# Patient Record
Sex: FEMALE | Race: BLACK OR AFRICAN AMERICAN
[De-identification: names, ages, dates, MRNs, and addresses within clinical notes are randomized per-mention and may not be internally consistent; named-entity substitution may affect disease eponyms.]

---

## 2019-03-13 ENCOUNTER — HOSPITAL ENCOUNTER (EMERGENCY)
Dept: HOSPITAL 74 - JER | Age: 29
Discharge: LEFT BEFORE DISCHARGE | End: 2019-03-13
Payer: COMMERCIAL

## 2019-03-13 VITALS — SYSTOLIC BLOOD PRESSURE: 98 MMHG | HEART RATE: 78 BPM | TEMPERATURE: 98.1 F | DIASTOLIC BLOOD PRESSURE: 61 MMHG

## 2019-03-13 VITALS — BODY MASS INDEX: 21.4 KG/M2

## 2019-03-13 DIAGNOSIS — F10.220: Primary | ICD-10-CM
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
## 2019-03-13 LAB
ALBUMIN SERPL-MCNC: 3.9 G/DL (ref 3.4–5)
ALP SERPL-CCNC: 84 U/L (ref 45–117)
ALT SERPL-CCNC: 23 U/L (ref 13–61)
ANION GAP SERPL CALC-SCNC: 7 MMOL/L (ref 8–16)
AST SERPL-CCNC: 62 U/L (ref 15–37)
BASOPHILS # BLD: 1.4 % (ref 0–2)
BILIRUB SERPL-MCNC: 0.3 MG/DL (ref 0.2–1)
BUN SERPL-MCNC: 7 MG/DL (ref 7–18)
CALCIUM SERPL-MCNC: 8.7 MG/DL (ref 8.5–10.1)
CHLORIDE SERPL-SCNC: 107 MMOL/L (ref 98–107)
CO2 SERPL-SCNC: 25 MMOL/L (ref 21–32)
CREAT SERPL-MCNC: 0.7 MG/DL (ref 0.55–1.3)
DEPRECATED RDW RBC AUTO: 18.5 % (ref 11.6–15.6)
EOSINOPHIL # BLD: 0.9 % (ref 0–4.5)
GLUCOSE SERPL-MCNC: 61 MG/DL (ref 74–106)
HCT VFR BLD CALC: 38.4 % (ref 32.4–45.2)
HGB BLD-MCNC: 13.3 GM/DL (ref 10.7–15.3)
LYMPHOCYTES # BLD: 42.9 % (ref 8–40)
MAGNESIUM SERPL-MCNC: 2 MG/DL (ref 1.8–2.4)
MCH RBC QN AUTO: 31.7 PG (ref 25.7–33.7)
MCHC RBC AUTO-ENTMCNC: 34.7 G/DL (ref 32–36)
MCV RBC: 91.2 FL (ref 80–96)
MONOCYTES # BLD AUTO: 4.8 % (ref 3.8–10.2)
NEUTROPHILS # BLD: 50 % (ref 42.8–82.8)
PLATELET # BLD AUTO: 345 K/MM3 (ref 134–434)
PLATELET BLD QL SMEAR: ADEQUATE
PMV BLD: 8.1 FL (ref 7.5–11.1)
POTASSIUM SERPLBLD-SCNC: 4.9 MMOL/L (ref 3.5–5.1)
PROT SERPL-MCNC: 9.3 G/DL (ref 6.4–8.2)
RBC # BLD AUTO: 4.21 M/MM3 (ref 3.6–5.2)
SODIUM SERPL-SCNC: 139 MMOL/L (ref 136–145)
WBC # BLD AUTO: 6 K/MM3 (ref 4–10)

## 2019-03-13 NOTE — PDOC
History of Present Illness





- General


Chief Complaint: Alcohol intoxication


Stated Complaint: Syncope/Near Syncope


Time Seen by Provider: 03/13/19 16:00





- History of Present Illness


Initial Comments: 





03/13/19 16:50





29 yo F h/o muti-substance abuse with EtOH dependence sent from [facility] for 

seizure activity precaution. She presented to [facility] and told the staff in 

waiting room that she's about to have a seizure. Patient was then sent to [facility] 

ED for further evaluation. However, patient stated that she drinks a lot every 

day since 19 years old, 1 bottle of vodka. Her last drink was this morning 2am. 

She also endorses headache, L flank pain, shaking, sweaty but denies chest pain

, shortness of breath, urinary symptom, n/v, fever or chills.





Past History





- Past Medical History


Allergies/Adverse Reactions: 


 Allergies











Allergy/AdvReac Type Severity Reaction Status Date / Time


 


No Known Allergies Allergy   Verified 11/23/16 20:39











Home Medications: 


Ambulatory Orders





Citalopram Hydrobromide [Celexa -] 15 mg PO DAILY 02/26/15 


Escitalopram Oxalate [Lexapro -] 10 mg PO DAILY 02/26/15 


traZODone HCL [Desyrel -] 50 mg PO HS #30 tablet 02/27/15 


Citalopram Hydrobromide [Celexa -] 10 mg PO DAILY #90 tablet 11/24/16 


Mirtazapine [Remeron -] 15 mg PO HS #30 tablet 11/24/16 








Anemia: No


Asthma: No


Cancer: No


Cardiac Disorders: No


CVA: No


COPD: No


CHF: No


Dementia: No


Diabetes: No


GI Disorders: No


 Disorders: No


HTN: No


Hypercholesterolemia: No


Kidney Stones: No


Liver Disease: No


Seizures: Yes


Thyroid Disease: No





- Surgical History


Abdominal Surgery: No


Appendectomy: No


Cardiac Surgery: No


Cholecystectomy: No


Lung Surgery: No


Neurologic Surgery: No


Orthopedic Surgery: No





- Suicide/Smoking/Psychosocial Hx


Smoking History: Never smoked


Have you smoked in the past 12 months: No


Number of Cigarettes Smoked Daily: 20


'Breaking Loose' booklet given: 11/23/16


Hx Alcohol Use: Yes


Drug/Substance Use Hx: No


Substance Use Type: Alcohol


Hx Substance Use Treatment: Yes





**Review of Systems





- Review of Systems


Able to Perform ROS?: Yes


Is the patient limited English proficient: Yes


Constitutional: No: Chills, Fever, Weakness


Respiratory: No: Cough, Shortness of Breath


ABD/GI: No: Diarrhea


: Yes: Flank Pain.  No: Burning, Dysuria, Hematuria, Urgency


Neurological: Yes: Headache, Seizure, Tremors





*Physical Exam





- Vital Signs


 Last Vital Signs











Temp Pulse Resp BP Pulse Ox


 


 98.1 F   78   16   98/61   100 


 


 03/13/19 15:59  03/13/19 15:59  03/13/19 15:59  03/13/19 15:59  03/13/19 15:59














- Physical Exam


General Appearance: Yes: Alcohol on Breath, Intoxicated


Respiratory/Chest: positive: Lungs Clear, Normal Breath Sounds


Cardiovascular: positive: S1, S2, Tachycardia.  negative: Murmur


Musculoskeletal: positive: CVA Tenderness (L)


Neurologic: positive: Alert, Confused, Disoriented (to time)





Moderate Sedation





- Procedure Monitoring


Vital Signs: 


Procedure Monitoring Vital Signs











Temperature  98.1 F   03/13/19 15:59


 


Pulse Rate  78   03/13/19 15:59


 


Respiratory Rate  16   03/13/19 15:59


 


Blood Pressure  98/61   03/13/19 15:59


 


O2 Sat by Pulse Oximetry (%)  100   03/13/19 15:59











ED Treatment Course





- LABORATORY


CBC & Chemistry Diagram: 


 03/13/19 18:20





 03/13/19 18:20





*DC/Admit/Observation/Transfer


Diagnosis at time of Disposition: 


Alcohol dependence


Qualifiers:


 Substance use status: unspecified alcohol-induced disorder Qualified Code(s): 

F10.29 - Alcohol dependence with unspecified alcohol-induced disorder








- Discharge Dispostion


Disposition: ELOPED


Condition at time of disposition: Unchanged/Unknown





- Referrals


Referrals: 


Jennifer Ratliff MD [Primary Care Provider] - 





- Patient Instructions





- Post Discharge Activity

## 2019-03-13 NOTE — PDOC
Attending Attestation





- HPI


HPI: 





03/13/19 18:13


The patient is a 28 year old female, from [facility], with a significant past 

medical history of multi-substance abuse with EtOH dependence who presents to 

the emergency department intoxicated with seizure like symptoms. Patient 

reports she told [facility] that she had a shaking episode prior to her arrival 

to the ED. The patient reports she fell yesterday but is unsure if she hit her 

head. Patient notes her last drink was this morning but has a history of 

drinking 1 bottle of vodka per day. Patient is a poor historian.





The patient denies chest pain,  or shortness of breath


The patient denies fever, chills, nausea, vomit, diarrhea or constipation.


 





Allergies: NKDA


Past surgical history: None reported 


Social history: multi-substance abuse with EtOH 


PCP: Dr. Jennifer Ratliff








- Physicial Exam


PE: 





03/13/19 18:14


GENERAL: (+) tremors, (+) intoxicated, (+) poor historian. Awake, alert, in no 

acute distress


HEAD: No signs of trauma


EYES: PERRLA, EOMI, sclera anicteric, conjunctiva clear


ENT: (+) tongue fasciculation.  Auricles normal inspection, hearing grossly 

normal, nares patent, oropharynx clear without exudates. Moist mucosa


NECK: Normal ROM, supple, no lymphadenopathy, JVD, or masses


LUNGS: Breath sounds equal, clear to auscultation bilaterally.  No wheezes, and 

no crackles


HEART: Regular rate and rhythm, normal S1 and S2, no murmurs, rubs or gallops


ABDOMEN: Soft, nontender, normoactive bowel sounds.  No guarding, no rebound.  

No masses


EXTREMITIES: Normal range of motion, no edema.  No clubbing or cyanosis. No 

cords, erythema, or tenderness


NEUROLOGICAL: Cranial nerves II through XII grossly intact.  Normal speech, 

normal gait


SKIN: Warm, Dry, normal turgor, no rashes or lesions noted.








<Aruna Vaz - Last Filed: 03/13/19 18:13>





- Resident


Resident Name: Alejo Le





- ED Attending Attestation


I have performed the following: I have examined & evaluated the patient, The 

case was reviewed & discussed with the resident, I agree w/resident's findings 

& plan, Exceptions are as noted





- Medical Decision Making





03/13/19 16:07








I, Dr. Keke Jorgensen DO, attest that this document has been prepared under 

my direction and personally reviewed by me in its entirety.   I further attest, 

that it accurately reflects all work, treatment, procedures and medical decision

-making performed by me.  


03/13/19 17:11


a/p: 27yo female with etoh and substance abuse


-pt sent from [facility] for poss seizure activity


-pt denies seizure today, states she fell going up the stairs yesterday, poss 

seizure activity bc she remembers her limbs shaking. unsure if loc. no tongue 

biting or loss of urine/bowel control


-will send labs, mild hand tremors and tongue fasciculations


-will give banana bag and librium


-will medically clear for park care


03/13/19 20:12


h/h stable


low tsh, will need free t4


alcohol pending


pt up and walking around the ED, speaking in clear sentences


03/13/19 20:13


pt has walked out of the ED. pt has eloped from the ED





<Keke Jorgensen - Last Filed: 03/13/19 20:14>





*DC/Admit/Observation/Transfer





<Keke Jorgensen - Last Filed: 03/13/19 20:14>


Diagnosis at time of Disposition: 


 Alcohol dependence








- Discharge Dispostion


Disposition: ELOPED


Condition at time of disposition: Unchanged/Unknown





- Referrals


Referrals: 


Jennifer Ratliff MD [Primary Care Provider] - 





- Patient Instructions





- Post Discharge Activity





**Heart Score/ECG Review





- ECG Intrepretation


Comment:: 





03/13/19 17:53


sinus at 89, nl axis, nl interval, no acute st/t wave findings





<Keke Jorgensen - Last Filed: 03/13/19 20:14>





Attestations





- Attestations





03/13/19 18:15





Documentation prepared by Arnua Vaz, acting as medical scribe for Keke Jorgensen DO, MD





<Aruna Vaz - Last Filed: 03/13/19 18:13>

## 2019-03-14 NOTE — EKG
Test Reason : 

Blood Pressure : ***/*** mmHG

Vent. Rate : 089 BPM     Atrial Rate : 089 BPM

   P-R Int : 152 ms          QRS Dur : 074 ms

    QT Int : 384 ms       P-R-T Axes : 050 057 044 degrees

   QTc Int : 467 ms

 

NORMAL SINUS RHYTHM

POSSIBLE LEFT ATRIAL ENLARGEMENT

POSSIBLE ANTERIOR INFARCT , AGE UNDETERMINED

ABNORMAL ECG

NO PREVIOUS ECGS AVAILABLE

Confirmed by KATHLEEN FORD MD ([id number]) on 3/14/2019 10:50:16 AM

 

Referred By:             Confirmed By:KATHLEEN FORD MD

## 2019-09-11 ENCOUNTER — HOSPITAL ENCOUNTER (INPATIENT)
Dept: HOSPITAL 74 - YASAS | Age: 29
LOS: 4 days | Discharge: HOME | End: 2019-09-15
Attending: SURGERY | Admitting: SURGERY
Payer: COMMERCIAL

## 2019-09-11 VITALS — BODY MASS INDEX: 24 KG/M2

## 2019-09-11 DIAGNOSIS — F19.24: ICD-10-CM

## 2019-09-11 DIAGNOSIS — F17.213: ICD-10-CM

## 2019-09-11 DIAGNOSIS — F10.24: ICD-10-CM

## 2019-09-11 DIAGNOSIS — Z86.69: ICD-10-CM

## 2019-09-11 DIAGNOSIS — F32.9: ICD-10-CM

## 2019-09-11 DIAGNOSIS — F10.230: Primary | ICD-10-CM

## 2019-09-11 DIAGNOSIS — F10.282: ICD-10-CM

## 2019-09-11 PROCEDURE — HZ2ZZZZ DETOXIFICATION SERVICES FOR SUBSTANCE ABUSE TREATMENT: ICD-10-PCS | Performed by: ALLERGY & IMMUNOLOGY

## 2019-09-11 RX ADMIN — Medication SCH MG: at 21:41

## 2019-09-11 NOTE — HP
CIWA Score


Nausea/Vomitin-Mild Nausea/No Vomiting


Muscle Tremors: 3


Anxiety: 3


Agitation: 4-Moderately Restless


Paroxysmal Sweats: 1-Minimal Palms Moist


Orientation: 0-Oriented


Tacttile Disturbances: 1-Very Mild Itch/Numbness


Auditory Disturbances: 0-None


Visual Disturbances: 3-Moderate Sensitivity


Headache: 0-None Present


CIWA-Ar Total Score: 16





- Admission Criteria


[facility] Guidelines: Admission for Medically Managed Detox: 


Requires at least one of the followin. CIWA greater than 12


2. Seizures within the past 24 hours


3. Delirium tremens within the past 24 hours


4. Hallucinations within the past 24 hours


5. Acute intervention needed for co  occurring medical disorder


6. Acute intervention needed for co  occurring psychiatric disorder


7. Severe withdrawal that cannot be handled at a lower level of care (continued


    vomiting, continued diarrhea, abnormal vital signs) requiring intravenous


    medication and/or fluids


8. Pregnancy





Patient presents the following: CIWA greater than 12


Admission Criteria Met: Admission criteria met





Admission ROS S





- \[facility]\""


Chief Complaint: 


I WASNT FEEING GOOD I COULDNT TAKE IT


Allergies/Adverse Reactions: 


 Allergies











Allergy/AdvReac Type Severity Reaction Status Date / Time


 


No Known Allergies Allergy   Verified 19 18:01











History of Present Illness: 


ETOH SINCE AGE 21, PROBLEMATIC SINCE AGE 22. 8 MOS ABSTINENCE IN 2018.


DRINKING 8 CANS BEER DAILY.


SHAKY 3 HOURS SP LAST DRINK





- Ebola screening


Have you traveled outside of the country in the last 21 days: No (N)


Have you had contact with anyone from an Ebola affected area: No


Do you have a fever: No





- Review of Systems


Constitutional: Unexplained wgt Loss


EENT: reports: Recent change in vision


Respiratory: reports: No Symptoms reported


Cardiac: reports: No Symptoms Reported


GI: reports: No Symptoms Reported


: reports: No Symptoms Reported


Musculoskeletal: reports: No Symptoms Reported


Integumentary: reports: No Symptoms Reported


Neuro: reports: Tremors


Endocrine: reports: No Symptoms Reported


Hematology: reports: No Symptoms Reported


Psychiatric: reports: Judgement Intact, Mood/Affect Appropiate, Orientated x3





Patient History





- Patient Medical History


Hx Anemia: No


Hx Asthma: No


Hx Chronic Obstructive Pulmonary Disease (COPD): No


Hx Cancer: No


Hx Cardiac Disorders: No


Hx Congestive Heart Failure: No


Hx Hypertension: No


Hx Hypercholesterolemia: No


Hx Pacemaker: No


HX Cerebrovascular Accident: No


Hx Seizures: Yes


Hx Dementia: No


Hx Diabetes: No


Hx Gastrointestinal Disorders: No


Hx Liver Disease: No


Hx Genitourinary Disorders: No


Hx Sexually Transmitted Disorders: No


Hx Renal Disease (ESRD): No


Hx Thyroid Disease: No


Hx Human Immunodeficiency Virus (HIV): No (LAST 10/16 NEGATIVE)


Hx Hepatitis C: No


Hx Depression: Yes (WAS TAKING LEXAPRO NOW OFF, SEROQUEL 25MG)


Hx Suicide Attempt: No


Hx Bipolar Disorder: No


Hx Schizophrenia: No





- Patient Surgical History


Past Surgical History: No


Hx Neurologic Surgery: No


Hx Cataract Extraction: No


Hx Cardiac Surgery: No


Hx Lung Surgery: No


Hx Breast Surgery: No


Hx Breast Biopsy: No


Hx Abdominal Surgery: No


Hx Appendectomy: No


Hx Cholecystectomy: No


Hx Genitourinary Surgery: No


Hx  Section: No


Hx Orthopedic Surgery: No


Anesthesia Reaction: No





- PPD History


Date: 18





- Reproductive History


Last Menstrual Period: 21


Patient Pregnant: No





- Smoking Cessation


Smoking history: Never smoked


Have you smoked in the past 12 months: No


Aproximately how many cigarettes per day: 20


Hx Chewing Tobacco Use: No





- Substances abused


  ** Alcohol


Substance route: Oral


Frequency: Daily


Amount used: BUDWEISER - 8 CANS


Age of first use: 21


Date of last use: 09/10/19





Family Disease History





- Family Disease History


Family Disease History: Other: Grandparent (ALCOHOL, FROM CVA), Sister (

ALCOHOL)





Admission Physical Exam [facility]





- Vital Signs


Vital Signs: 


 Vital Signs - 24 hr











  19





  18:03


 


Temperature 98.5 F


 


Pulse Rate 87


 


Respiratory 20





Rate 


 


Blood Pressure 133/88














- Physical


General Appearance: Yes: Disheveled, Tremorous, Irritable, Anxious


HEENTM: Yes: EOMI


Respiratory: Yes: Within Normal Limits, Chest Non-Tender, Lungs Clear


Neck: Yes: Within Normal Limits, No masses,lesions,Nodules


Breast: Yes: Breast Exam Deferred


Cardiology: Yes: Within Normal Limits, Regular Rhythm, Regular Rate, S1, S2


Abdominal: Yes: Normal Bowel Sounds, Non Tender


Genitourinary: Yes: Within Normal Limits


Back: Yes: Within Normal Limits, Normal Inspection


Musculoskeletal: Yes: full range of Motion, Gait Steady, Pelvis Stable


Extremities: Yes: Within Normal Limits, Normal Capillary Refill, Normal 

Inspection, Normal Range of Motion, Non-Tender


Neurological: Yes: Within Normal Limits, CNs II-XII NML intact, Fully Oriented, 

Alert, Motor Strength 5/5, Normal Mood/Affect


Integumentary: Yes: Within Normal Limits, Normal Color





- Diagnostic


(1) Alcohol dependence with uncomplicated withdrawal


Current Visit: Yes   Status: Acute   





(2) Alcohol related seizure


Current Visit: No   Status: Resolved   





Cleared for Admission S





- Detox or Rehab


[facility] Level of Care: Medically Supervised





Breathalyzer





- Breathalyzer


Breathalyzer: 0.100





Urine Drug Screen





- Test Device


Lot number: [id number]


Expiration date: 21





- Control


Is test valid?: Yes





- Results


Drug screen NEGATIVE: No


Urine drug screen results: BZO-Benzodiazepines





Inpatient Rehab Admission





- Rehab Decision to Admit


Inpatient rehab admission?: No

## 2019-09-12 LAB
ALBUMIN SERPL-MCNC: 3.5 G/DL (ref 3.4–5)
ALP SERPL-CCNC: 60 U/L (ref 45–117)
ALT SERPL-CCNC: 22 U/L (ref 13–61)
ANION GAP SERPL CALC-SCNC: 7 MMOL/L (ref 8–16)
AST SERPL-CCNC: 31 U/L (ref 15–37)
BILIRUB SERPL-MCNC: 0.9 MG/DL (ref 0.2–1)
BUN SERPL-MCNC: 9.8 MG/DL (ref 7–18)
CALCIUM SERPL-MCNC: 9 MG/DL (ref 8.5–10.1)
CHLORIDE SERPL-SCNC: 103 MMOL/L (ref 98–107)
CO2 SERPL-SCNC: 26 MMOL/L (ref 21–32)
CREAT SERPL-MCNC: 0.8 MG/DL (ref 0.55–1.3)
DEPRECATED RDW RBC AUTO: 16 % (ref 11.6–15.6)
GLUCOSE SERPL-MCNC: 75 MG/DL (ref 74–106)
HCT VFR BLD CALC: 35.9 % (ref 32.4–45.2)
HGB BLD-MCNC: 11.9 GM/DL (ref 10.7–15.3)
MCH RBC QN AUTO: 28.7 PG (ref 25.7–33.7)
MCHC RBC AUTO-ENTMCNC: 33.2 G/DL (ref 32–36)
MCV RBC: 86.7 FL (ref 80–96)
PLATELET # BLD AUTO: 405 K/MM3 (ref 134–434)
PMV BLD: 7.5 FL (ref 7.5–11.1)
POTASSIUM SERPLBLD-SCNC: 3.9 MMOL/L (ref 3.5–5.1)
PROT SERPL-MCNC: 7.6 G/DL (ref 6.4–8.2)
RBC # BLD AUTO: 4.14 M/MM3 (ref 3.6–5.2)
SODIUM SERPL-SCNC: 136 MMOL/L (ref 136–145)
WBC # BLD AUTO: 5.9 K/MM3 (ref 4–10)

## 2019-09-12 RX ADMIN — RANITIDINE SCH MG: 150 TABLET ORAL at 11:45

## 2019-09-12 RX ADMIN — Medication SCH MG: at 22:03

## 2019-09-12 RX ADMIN — RANITIDINE SCH MG: 150 TABLET ORAL at 22:03

## 2019-09-12 RX ADMIN — QUETIAPINE FUMARATE SCH MG: 25 TABLET ORAL at 22:03

## 2019-09-12 RX ADMIN — Medication SCH TAB: at 09:49

## 2019-09-12 NOTE — PN
[facility] CIWA





- CIWA Score


Nausea/Vomitin-Mild Nausea/No Vomiting


Muscle Tremors: 2


Anxiety: 4-Mod. Anxious/Guarded


Agitation: 3


Paroxysmal Sweats: 2


Orientation: 0-Oriented


Tacttile Disturbances: 0-None


Auditory Disturbances: 0-None


Visual Disturbances: 0-None


Headache: 0-None Present


CIWA-Ar Total Score: 12





[facility] Progress Note (SOAP)


Subjective: 





29 years old female 3rd patient [facility] admission since 


was admitted on 09/11/10 for alcohol withdrawal sx management


doing well with librium detox regimen


long history of GERD c/o indigestion 


abdomen soft no rebound tenderness, + hyperactive bowel sound x 4 


report diarrhea


encourage to have pepto 


discontinue motrin and begin zantac 150 mg po bid


Objective: 





19 10:02


 Vital Signs











Temperature  97.5 F L  19 09:15


 


Pulse Rate  81   19 09:15


 


Respiratory Rate  18   19 09:15


 


Blood Pressure  105/65   19 09:15


 


O2 Sat by Pulse Oximetry (%)      








 Laboratory Last Values











WBC  5.9 K/mm3 (4.0-10.0)   19  08:00    


 


RBC  4.14 M/mm3 (3.60-5.2)   19  08:00    


 


Hgb  11.9 GM/dL (10.7-15.3)   19  08:00    


 


Hct  35.9 % (32.4-45.2)   19  08:00    


 


MCV  86.7 fl (80-96)   19  08:00    


 


MCH  28.7 pg (25.7-33.7)   19  08:00    


 


MCHC  33.2 g/dl (32.0-36.0)   19  08:00    


 


RDW  16.0 % (11.6-15.6)  H  19  08:00    


 


Plt Count  405 K/MM3 (134-434)   19  08:00    


 


MPV  7.5 fl (7.5-11.1)   19  08:00    


 


POC Urine HCG, Qual  Negative   19  18:51    








lab noted


Assessment: 





19 10:02


alcohol withdrawal sx


Plan: 





continue librium detox regimen

## 2019-09-12 NOTE — CONSULT
[facility] Psychiatric Consult





- Data


Date of interview: 09/12/19


Admission source: [facility]


Identifying data: Patient is a 29 year old single female, without children, 

unemployed, residing and financially supported by mother. This is one of 

multiple admissions for patient. Patient admitted to  for alcohol dependence.


Substance Abuse History: - Smoking Cessation.  Smoking history: Never smoked.  

Have you smoked in the past 12 months: No.  Aproximately how many cigarettes 

per day: 20.  Hx Chewing Tobacco Use: No.  - Substances abused.  ** Alcohol.  

Substance route: Oral.  Frequency: Daily.  Amount used: BUDWEISER - 8 CANS.  

Age of first use: 21.  Date of last use: 09/10/19


Medical History: denies.


Psychiatric History: Patient denies  history of psychiatric hospitalizations 

and suicide attempts. States her first psychiatric contact occured four months 

ago after she decided to see a psychiatrist at the [facility] in the 

[place] to address her depression. Reports only seeing the psychiatrist for two 

months and was prescribed wellbutrin (unknown dose) and Seroquel 25mg HS. She 

relapsed and discontinued her medications. At present patient reports 

difficulty sleeping.


Physical/Sexual Abuse/Trauma History: denies.





Mental Status Exam





- Mental Status Exam


Alert and Oriented to: Time, Place, Person


Cognitive Function: Good


Patient Appearance: Well Groomed


Mood: Withdrawn


Affect: Mood Congruent


Patient Behavior: Fatigued, Cooperative


Speech Pattern: Clear


Voice Loudness: Moderately Soft/Quiet


Thought Process: Goal Oriented


Thought Disorder: Not Present


Hallucinations: Denies


Suicidal Ideation: Denies


Homicidal Ideation: Denies


Insight/Judgement: Poor


Sleep: Poorly


Appetite: Fair


Muscle strength/Tone: Normal


Gait/Station: Normal





Psychiatric Findings





- Problem List (Axis 1, 2,3)


(1) Alcohol-induced mood disorder


Current Visit: Yes   Status: Acute   





(2) Alcohol dependence with uncomplicated withdrawal


Current Visit: Yes   Status: Acute   





(3) Alcohol dependence


Current Visit: Yes   Status: Acute   


Qualifiers: 


   Substance use status: unspecified alcohol-induced disorder   Qualified Code(s

): F10.29 - Alcohol dependence with unspecified alcohol-induced disorder   





(4) Alcohol-induced sleep disorder


Current Visit: Yes   Status: Acute   





- Initial Treatment Plan


Initial Treatment Plan: Psychoeducation provided. Detoxification in progress. 

Will order Seroquel 25mg HS. Benefits and side effects discussed. Verbal 

consent given.

## 2019-09-13 RX ADMIN — QUETIAPINE FUMARATE SCH MG: 25 TABLET ORAL at 22:29

## 2019-09-13 RX ADMIN — RANITIDINE SCH MG: 150 TABLET ORAL at 09:54

## 2019-09-13 RX ADMIN — Medication SCH MG: at 22:29

## 2019-09-13 RX ADMIN — RANITIDINE SCH MG: 150 TABLET ORAL at 22:29

## 2019-09-13 RX ADMIN — HYDROXYZINE PAMOATE PRN MG: 25 CAPSULE ORAL at 09:59

## 2019-09-13 RX ADMIN — Medication SCH TAB: at 09:54

## 2019-09-13 NOTE — PN
S CIWA





- CIWA Score


Nausea/Vomitin


Muscle Tremors: 2


Anxiety: 2


Agitation: 2


Paroxysmal Sweats: No Perspiration


Orientation: 0-Oriented


Tacttile Disturbances: 1-Very Mild Itch/Numbness


Auditory Disturbances: 0-None


Visual Disturbances: 0-None


Headache: 2-Mild


CIWA-Ar Total Score: 11





[facility] Progress Note (SOAP)


Subjective: 





alert,irritable,anxious,interrupted sleep,tremor


Objective: 





19 14:53


 Laboratory Last Values











WBC  5.9 K/mm3 (4.0-10.0)   19  08:00    


 


RBC  4.14 M/mm3 (3.60-5.2)   19  08:00    


 


Hgb  11.9 GM/dL (10.7-15.3)   19  08:00    


 


Hct  35.9 % (32.4-45.2)   19  08:00    


 


MCV  86.7 fl (80-96)   19  08:00    


 


MCH  28.7 pg (25.7-33.7)   19  08:00    


 


MCHC  33.2 g/dl (32.0-36.0)   19  08:00    


 


RDW  16.0 % (11.6-15.6)  H  19  08:00    


 


Plt Count  405 K/MM3 (134-434)   19  08:00    


 


MPV  7.5 fl (7.5-11.1)   19  08:00    


 


Sodium  136 mmol/L (136-145)   19  08:00    


 


Potassium  3.9 mmol/L (3.5-5.1)   19  08:00    


 


Chloride  103 mmol/L ()   19  08:00    


 


Carbon Dioxide  26 mmol/L (21-32)   19  08:00    


 


Anion Gap  7 MMOL/L (8-16)  L  19  08:00    


 


BUN  9.8 mg/dL (7-18)   19  08:00    


 


Creatinine  0.8 mg/dL (0.55-1.3)   19  08:00    


 


Est GFR (CKD-EPI)AfAm  117.10   19  08:00    


 


Est GFR (CKD-EPI)NonAf  101.04   09/12/19  08:00    


 


Random Glucose  75 mg/dL ()   19  08:00    


 


Calcium  9.0 mg/dL (8.5-10.1)   19  08:00    


 


Total Bilirubin  0.9 mg/dL (0.2-1)   19  08:00    


 


AST  31 U/L (15-37)   19  08:00    


 


ALT  22 U/L (13-61)   19  08:00    


 


Alkaline Phosphatase  60 U/L ()   19  08:00    


 


Total Protein  7.6 g/dl (6.4-8.2)   19  08:00    


 


Albumin  3.5 g/dl (3.4-5.0)   19  08:00    


 


POC Urine HCG, Qual  Negative   19  18:51    


 


RPR Titer  Nonreactive  (NONREACTIVE)   19  08:00    











Assessment: 





19 14:54


withdrawal symptom


Plan: 





continue detox,librium regimen

## 2019-09-14 RX ADMIN — Medication SCH TAB: at 10:42

## 2019-09-14 RX ADMIN — ACETAMINOPHEN PRN MG: 325 TABLET ORAL at 22:30

## 2019-09-14 RX ADMIN — HYDROXYZINE PAMOATE PRN MG: 25 CAPSULE ORAL at 05:52

## 2019-09-14 RX ADMIN — Medication SCH MG: at 22:29

## 2019-09-14 RX ADMIN — RANITIDINE SCH MG: 150 TABLET ORAL at 22:29

## 2019-09-14 RX ADMIN — ACETAMINOPHEN PRN MG: 325 TABLET ORAL at 11:00

## 2019-09-14 RX ADMIN — QUETIAPINE FUMARATE SCH MG: 25 TABLET ORAL at 22:29

## 2019-09-14 RX ADMIN — HYDROXYZINE PAMOATE PRN MG: 25 CAPSULE ORAL at 17:30

## 2019-09-14 RX ADMIN — RANITIDINE SCH MG: 150 TABLET ORAL at 10:42

## 2019-09-14 NOTE — PN
S CIWA





- CIWA Score


Nausea/Vomitin-No Nausea/No Vomiting


Muscle Tremors: 3


Anxiety: 3


Agitation: 2


Paroxysmal Sweats: No Perspiration


Orientation: 0-Oriented


Tacttile Disturbances: 0-None


Auditory Disturbances: 0-None


Visual Disturbances: 0-None


Headache: 0-None Present


CIWA-Ar Total Score: 8





[facility] Progress Note (SOAP)


Subjective: 





Tremors, Anxious.


Objective: 


PATIENT A & O X 3, OBSERVED AMBULATING ON DETOX UNIT UNASSISTED. IN NO ACUTE 

DISTRESS.





19 18:05


 Vital Signs











Temperature  98.3 F   19 17:49


 


Pulse Rate  90   19 17:49


 


Respiratory Rate  18   19 17:49


 


Blood Pressure  120/86   19 17:49


 


O2 Sat by Pulse Oximetry (%)      








 Laboratory Tests











  19





  18:51 08:00 08:00


 


WBC   5.9 


 


RBC   4.14 


 


Hgb   11.9 


 


Hct   35.9 


 


MCV   86.7 


 


MCH   28.7 


 


MCHC   33.2 


 


RDW   16.0 H 


 


Plt Count   405 


 


MPV   7.5 


 


Sodium    136


 


Potassium    3.9


 


Chloride    103


 


Carbon Dioxide    26


 


Anion Gap    7 L


 


BUN    9.8


 


Creatinine    0.8


 


Est GFR (CKD-EPI)AfAm    117.10


 


Est GFR (CKD-EPI)NonAf    101.04


 


Random Glucose    75


 


Calcium    9.0


 


Total Bilirubin    0.9


 


AST    31


 


ALT    22


 


Alkaline Phosphatase    60


 


Total Protein    7.6


 


Albumin    3.5


 


POC Urine HCG, Qual  Negative  


 


RPR Titer   














  19





  08:00


 


WBC 


 


RBC 


 


Hgb 


 


Hct 


 


MCV 


 


MCH 


 


MCHC 


 


RDW 


 


Plt Count 


 


MPV 


 


Sodium 


 


Potassium 


 


Chloride 


 


Carbon Dioxide 


 


Anion Gap 


 


BUN 


 


Creatinine 


 


Est GFR (CKD-EPI)AfAm 


 


Est GFR (CKD-EPI)NonAf 


 


Random Glucose 


 


Calcium 


 


Total Bilirubin 


 


AST 


 


ALT 


 


Alkaline Phosphatase 


 


Total Protein 


 


Albumin 


 


POC Urine HCG, Qual 


 


RPR Titer  Nonreactive








LABS NOTED.


Assessment: 





19 18:05


WITHDRAWAL SYMPTOMS.


Plan: 





CONTINUE DETOX.





PATIENT SCHEDULED FOR D/C FROM DETOX UNIT TOMORROW.

## 2019-09-15 VITALS — DIASTOLIC BLOOD PRESSURE: 81 MMHG | TEMPERATURE: 98.3 F | HEART RATE: 77 BPM | SYSTOLIC BLOOD PRESSURE: 112 MMHG

## 2019-09-15 RX ADMIN — HYDROXYZINE PAMOATE PRN MG: 25 CAPSULE ORAL at 06:15

## 2019-09-15 NOTE — DS
[facility] Detox Discharge Summary


Admission Date: 


09/11/19





Discharge Date: 09/15/19





- History


Present History: Alcohol Dependence


Additional Comments: 





29 years old female admitted on 09/11/19 for alcohol withdrawal sx management


did well with librium detox regimen


no complication through out the detox regimen


seen by psychiatrist treated with seroquel 


patient tolerate well 





patient is alert oriented x 3 


cardiac S1S2 regular rate rhythm


ekg indicates atrial enlargement


respiratory clear lung bilaterally on auscultation 


skin warm dry





- Physical Exam Results


Vital Signs: 


 Vital Signs











Temperature  98.3 F   09/15/19 09:16


 


Pulse Rate  77   09/15/19 09:16


 


Respiratory Rate  18   09/15/19 09:16


 


Blood Pressure  112/81   09/15/19 09:16


 


O2 Sat by Pulse Oximetry (%)      











Pertinent Admission Physical Exam Findings: 





alcohol withdrawal sx 


 Laboratory Last Values











WBC  5.9 K/mm3 (4.0-10.0)   09/12/19  08:00    


 


RBC  4.14 M/mm3 (3.60-5.2)   09/12/19  08:00    


 


Hgb  11.9 GM/dL (10.7-15.3)   09/12/19  08:00    


 


Hct  35.9 % (32.4-45.2)   09/12/19  08:00    


 


MCV  86.7 fl (80-96)   09/12/19  08:00    


 


MCH  28.7 pg (25.7-33.7)   09/12/19  08:00    


 


MCHC  33.2 g/dl (32.0-36.0)   09/12/19  08:00    


 


RDW  16.0 % (11.6-15.6)  H  09/12/19  08:00    


 


Plt Count  405 K/MM3 (134-434)   09/12/19  08:00    


 


MPV  7.5 fl (7.5-11.1)   09/12/19  08:00    


 


Sodium  136 mmol/L (136-145)   09/12/19  08:00    


 


Potassium  3.9 mmol/L (3.5-5.1)   09/12/19  08:00    


 


Chloride  103 mmol/L ()   09/12/19  08:00    


 


Carbon Dioxide  26 mmol/L (21-32)   09/12/19  08:00    


 


Anion Gap  7 MMOL/L (8-16)  L  09/12/19  08:00    


 


BUN  9.8 mg/dL (7-18)   09/12/19  08:00    


 


Creatinine  0.8 mg/dL (0.55-1.3)   09/12/19  08:00    


 


Est GFR (CKD-EPI)AfAm  117.10   09/12/19  08:00    


 


Est GFR (CKD-EPI)NonAf  101.04   09/12/19  08:00    


 


Random Glucose  75 mg/dL ()   09/12/19  08:00    


 


Calcium  9.0 mg/dL (8.5-10.1)   09/12/19  08:00    


 


Total Bilirubin  0.9 mg/dL (0.2-1)   09/12/19  08:00    


 


AST  31 U/L (15-37)   09/12/19  08:00    


 


ALT  22 U/L (13-61)   09/12/19  08:00    


 


Alkaline Phosphatase  60 U/L ()   09/12/19  08:00    


 


Total Protein  7.6 g/dl (6.4-8.2)   09/12/19  08:00    


 


Albumin  3.5 g/dl (3.4-5.0)   09/12/19  08:00    


 


POC Urine HCG, Qual  Negative   09/11/19  18:51    


 


RPR Titer  Nonreactive  (NONREACTIVE)   09/12/19  08:00    








lab noted





- Treatment


Hospital Course: Detox Protocol Followed, Detoxed Safely, Responded well, 

Discharged Condition Good, Rehab Referral Accepted


Patient has Accepted a Rehab Referral to: [facility] 





- Medication


Discharge Medications: 


Ambulatory Orders





Escitalopram Oxalate [Lexapro -] 10 mg PO DAILY 02/26/15 


Quetiapine Fumarate [Seroquel -] 25 mg PO HS 09/11/19 











- Diagnosis


(1) Alcohol dependence with uncomplicated withdrawal


Status: Acute   





(2) Nicotine dependence


Status: Acute   


Qualifiers: 


   Nicotine product type: cigarettes   Substance use status: in withdrawal   

Qualified Code(s): F17.213 - Nicotine dependence, cigarettes, with withdrawal   





(3) Substance induced mood disorder


Status: Suspected   





- AMA


Did Patient Leave Against Medical Advice: No